# Patient Record
Sex: FEMALE | Race: WHITE | NOT HISPANIC OR LATINO | Employment: OTHER | URBAN - NONMETROPOLITAN AREA
[De-identification: names, ages, dates, MRNs, and addresses within clinical notes are randomized per-mention and may not be internally consistent; named-entity substitution may affect disease eponyms.]

---

## 2017-01-04 RX ORDER — QUETIAPINE FUMARATE 25 MG/1
TABLET, FILM COATED ORAL
Qty: 30 TABLET | Refills: 0 | Status: SHIPPED | OUTPATIENT
Start: 2017-01-04 | End: 2017-03-02 | Stop reason: SDUPTHER

## 2017-01-09 ENCOUNTER — TELEPHONE (OUTPATIENT)
Dept: FAMILY MEDICINE CLINIC | Facility: CLINIC | Age: 82
End: 2017-01-09

## 2017-01-09 DIAGNOSIS — R94.120 ABNORMAL HEARING SCREEN: Primary | ICD-10-CM

## 2017-01-09 NOTE — TELEPHONE ENCOUNTER
SERGEY CALLED AND WANTS TO GO AHEAD AND SET MRS ESTEPHANIE UP WITH DR SOSA FOR HER HEARING  ORDER SENT

## 2017-01-10 DIAGNOSIS — I10 ESSENTIAL HYPERTENSION: ICD-10-CM

## 2017-01-10 RX ORDER — HYDRALAZINE HYDROCHLORIDE 25 MG/1
TABLET, FILM COATED ORAL
Qty: 90 TABLET | Refills: 0 | Status: SHIPPED | OUTPATIENT
Start: 2017-01-10 | End: 2017-02-06 | Stop reason: SDUPTHER

## 2017-01-23 ENCOUNTER — OFFICE VISIT (OUTPATIENT)
Dept: FAMILY MEDICINE CLINIC | Facility: CLINIC | Age: 82
End: 2017-01-23

## 2017-01-23 VITALS
DIASTOLIC BLOOD PRESSURE: 70 MMHG | HEIGHT: 61 IN | BODY MASS INDEX: 21.71 KG/M2 | SYSTOLIC BLOOD PRESSURE: 140 MMHG | WEIGHT: 115 LBS | TEMPERATURE: 96.8 F | HEART RATE: 62 BPM | OXYGEN SATURATION: 94 %

## 2017-01-23 DIAGNOSIS — I10 ESSENTIAL HYPERTENSION: Primary | ICD-10-CM

## 2017-01-23 DIAGNOSIS — R94.120 ABNORMAL HEARING SCREEN: ICD-10-CM

## 2017-01-23 DIAGNOSIS — D46.9 MDS (MYELODYSPLASTIC SYNDROME) (HCC): ICD-10-CM

## 2017-01-23 PROCEDURE — 99213 OFFICE O/P EST LOW 20 MIN: CPT | Performed by: FAMILY MEDICINE

## 2017-01-23 NOTE — MR AVS SNAPSHOT
Lamont Stewart   1/23/2017 1:45 PM   Office Visit    Dept Phone:  [phone]   Encounter #:  [id number]    Provider:  Mahesh Prince MD   Department:  [facility]                Your Full Care Plan              Today's Medication Changes          These changes are accurate as of: 1/23/17  2:11 PM.  If you have any questions, ask your nurse or doctor.               Medication(s)that have changed:     hydrALAZINE 25 MG tablet   Commonly known as:  APRESOLINE   TAKE 1 TABLET BY MOUTH EVERY 8 HOURS   What changed:  Another medication with the same name was removed. Continue taking this medication, and follow the directions you see here.   Changed by:  Mahesh Prince MD                  Your Updated Medication List          This list is accurate as of: 1/23/17  2:11 PM.  Always use your most recent med list.                alendronate 70 MG tablet   Commonly known as:  FOSAMAX   Take 1 tablet by mouth every 7 days.       amLODIPine 2.5 MG tablet   Commonly known as:  NORVASC   Take 1 tablet by mouth 2 (Two) Times a Day.       azithromycin 250 MG tablet   Commonly known as:  ZITHROMAX   Take 2 tablets the first day, then 1 tablet daily for 4 days.       Calcium + D3 600-200 MG-UNIT tablet       carbonyl iron 45 MG tablet tablet   Commonly known as:  FEOSOL       CLARITIN 10 MG tablet   Generic drug:  loratadine       hydrALAZINE 25 MG tablet   Commonly known as:  APRESOLINE   TAKE 1 TABLET BY MOUTH EVERY 8 HOURS       latanoprost 0.005 % ophthalmic solution   Commonly known as:  XALATAN       lisinopril 10 MG tablet   Commonly known as:  PRINIVIL,ZESTRIL   TAKE 1 TABLET BY MOUTH DAILY       MIRALAX powder   Generic drug:  polyethylene glycol       pantoprazole 40 MG EC tablet   Commonly known as:  PROTONIX       QUEtiapine 25 MG tablet   Commonly known as:  SEROquel   1/2 PO QHS       timolol 0.5 % ophthalmic solution   Commonly known as:  TIMOPTIC       "VITAMIN B-12 PO       VITAMIN D-3 PO               You Were Diagnosed With        Codes Comments    Essential hypertension    -  Primary ICD-10-CM: I10  ICD-9-CM: 401.9     MDS (myelodysplastic syndrome)     ICD-10-CM: D46.9  ICD-9-CM: 238.75     Abnormal hearing screen     ICD-10-CM: R94.120  ICD-9-CM: 794.15       Instructions     None    Patient Instructions History      Upcoming Appointments     Visit Type Date Time Department    FOLLOW UP 1/23/2017  1:45 PM [facility] [place]      CoNarrative Signup     Our records indicate that you have declined [employer] signup. If you would like to sign up for CoNarrative, please email [email or web address] or call [phone] to obtain an activation code.             Other Info from Your Visit           Allergies     Penicillins      Cardizem Cd [Diltiazem Hcl Er Beads]      Dyazide [Hydrochlorothiazide W-triamterene]      Lipitor [Atorvastatin]      Macrodantin [Nitrofurantoin Macrocrystal]      Sulfonylureas        Reason for Visit     Hypertension           Vital Signs     Blood Pressure Pulse Temperature Height Weight Last Menstrual Period    140/70 (BP Location: Left arm, Patient Position: Sitting) 62 96.8 °F (36 °C) 60.6\" (153.9 cm) 115 lb (52.2 kg) (LMP Unknown)    Oxygen Saturation Body Mass Index Smoking Status             94% 22.02 kg/m2 Former Smoker         Problems and Diagnoses Noted     High blood pressure    MDS (myelodysplastic syndrome)    Abnormal hearing screen            "

## 2017-01-23 NOTE — PROGRESS NOTES
Subjective   Lamont Stewart is a 89 y.o. female.     History of Present Illness   Chief complaint is hypertension.  See regimen from her last note in December.  Has had blood recently and her pressure is been a little bit more labile.  Voices no complaints however.  The following portions of the patient's history were reviewed and updated as appropriate: allergies, current medications, past family history, past medical history, past social history, past surgical history and problem list.    Review of Systems   Constitutional: Negative for fatigue.   HENT: Negative.    Respiratory: Negative for shortness of breath.    Cardiovascular: Negative for chest pain.       Objective   Physical Exam   Constitutional: She is oriented to person, place, and time. She appears well-nourished.   HENT:   Head: Normocephalic.   Eyes: EOM are normal.   Cardiovascular: Normal rate.    Pulmonary/Chest: Effort normal.   Neurological: She is alert and oriented to person, place, and time.   Skin: Skin is warm.       Assessment/Plan   Lamont was seen today for hypertension.    Diagnoses and all orders for this visit:    Essential hypertension    MDS (myelodysplastic syndrome)    Abnormal hearing screen    Hypertension.  Reading today is good.  She has received 2 units of blood and her pressure has been up though the last few days.  Still,I want to continue her blood pressure regimen the same, that is, amlodipine 2.5 twice a day.  Hydralazine 25 3 times a day.  Lisinopril daily at noon.we'll follow up on this in about 6 weeks    Myelodysplastic syndrome.  Her coloring is so much better since she's received the blood.  Has a regular appointment with Dr. Tomlin scheduled to follow this.    Abnormal hearing.  She was supposed to have an appointment to see the audiologist.  Had to be canceled.  This is scheduled for February 1.

## 2017-02-06 DIAGNOSIS — I10 ESSENTIAL HYPERTENSION: ICD-10-CM

## 2017-02-06 RX ORDER — HYDRALAZINE HYDROCHLORIDE 25 MG/1
TABLET, FILM COATED ORAL
Qty: 90 TABLET | Refills: 0 | Status: SHIPPED | OUTPATIENT
Start: 2017-02-06 | End: 2017-03-07 | Stop reason: SDUPTHER

## 2017-03-02 RX ORDER — QUETIAPINE FUMARATE 25 MG/1
TABLET, FILM COATED ORAL
Qty: 30 TABLET | Refills: 0 | Status: SHIPPED | OUTPATIENT
Start: 2017-03-02 | End: 2017-04-24 | Stop reason: SDUPTHER

## 2017-03-02 RX ORDER — LISINOPRIL 10 MG/1
TABLET ORAL
Qty: 90 TABLET | Refills: 0 | Status: SHIPPED | OUTPATIENT
Start: 2017-03-02 | End: 2017-04-24 | Stop reason: SDUPTHER

## 2017-03-07 DIAGNOSIS — I10 ESSENTIAL HYPERTENSION: ICD-10-CM

## 2017-03-09 RX ORDER — HYDRALAZINE HYDROCHLORIDE 25 MG/1
TABLET, FILM COATED ORAL
Qty: 90 TABLET | Refills: 0 | Status: SHIPPED | OUTPATIENT
Start: 2017-03-09 | End: 2017-04-05 | Stop reason: SDUPTHER

## 2017-03-14 ENCOUNTER — OFFICE VISIT (OUTPATIENT)
Dept: FAMILY MEDICINE CLINIC | Facility: CLINIC | Age: 82
End: 2017-03-14

## 2017-03-14 VITALS
HEART RATE: 85 BPM | SYSTOLIC BLOOD PRESSURE: 130 MMHG | BODY MASS INDEX: 22.47 KG/M2 | OXYGEN SATURATION: 96 % | DIASTOLIC BLOOD PRESSURE: 70 MMHG | HEIGHT: 61 IN | WEIGHT: 119 LBS

## 2017-03-14 DIAGNOSIS — D46.9 MDS (MYELODYSPLASTIC SYNDROME) (HCC): ICD-10-CM

## 2017-03-14 DIAGNOSIS — D64.9 ANEMIA, UNSPECIFIED TYPE: ICD-10-CM

## 2017-03-14 DIAGNOSIS — I10 ESSENTIAL HYPERTENSION: Primary | ICD-10-CM

## 2017-03-14 PROCEDURE — 99213 OFFICE O/P EST LOW 20 MIN: CPT | Performed by: FAMILY MEDICINE

## 2017-03-14 NOTE — PROGRESS NOTES
Subjective   Lamont Stewart is a 89 y.o. female.     History of Present Illness   89-year-old female with multiple chronic problems including hypertension, chronic anemia due to a myelodysplastic syndrome.  Is followed closely by Dr. Tomlin.  Regarding the latter, he has advised that she take no more iron.  She is getting transfusions and I'm sure there is some concern about iron overload.  Overall today she feels well.  She's very often week but feels good today.  Recently has obtained hearing aids which is made her life much improved.  The following portions of the patient's history were reviewed and updated as appropriate: allergies, current medications, past family history, past medical history, past social history, past surgical history and problem list.    Review of Systems   Constitutional: Positive for fatigue. Negative for fever and unexpected weight change.   HENT: Negative.    Respiratory: Negative for cough and shortness of breath.    Cardiovascular: Negative for leg swelling.   Hematological: Negative for adenopathy.       Objective   Physical Exam   Constitutional: She appears well-nourished.   HENT:   Right Ear: External ear normal.   Left Ear: External ear normal.   Eyes: EOM are normal.   Neck: Neck supple.   Cardiovascular: Normal rate and regular rhythm.    Murmur heard.   Systolic murmur is present with a grade of 3/6   Pulmonary/Chest: Effort normal and breath sounds normal.   Vitals reviewed.      Assessment/Plan   Lamont was seen today for hypertension.    Diagnoses and all orders for this visit:    Essential hypertension    Anemia, unspecified type    MDS (myelodysplastic syndrome)    Hypertension.  Overall she is doing well.    Anemia.  Followed closely by Dr. Tomlin.  Last blood count looked very good.    Addendum.  We reviewed some of her health maintenance issues.  Dr. Tomlin does not want her to take the flu vaccine anymore.  She refuses all other vaccinations.

## 2017-04-03 RX ORDER — PANTOPRAZOLE SODIUM 40 MG/1
40 TABLET, DELAYED RELEASE ORAL DAILY
Qty: 90 TABLET | Refills: 1 | Status: SHIPPED | OUTPATIENT
Start: 2017-04-03 | End: 2017-04-24 | Stop reason: SDUPTHER

## 2017-04-05 DIAGNOSIS — I10 ESSENTIAL HYPERTENSION: ICD-10-CM

## 2017-04-05 RX ORDER — HYDRALAZINE HYDROCHLORIDE 25 MG/1
TABLET, FILM COATED ORAL
Qty: 90 TABLET | Refills: 0 | Status: SHIPPED | OUTPATIENT
Start: 2017-04-05 | End: 2017-05-02 | Stop reason: SDUPTHER

## 2017-04-24 ENCOUNTER — OFFICE VISIT (OUTPATIENT)
Dept: FAMILY MEDICINE CLINIC | Facility: CLINIC | Age: 82
End: 2017-04-24

## 2017-04-24 VITALS
HEART RATE: 75 BPM | DIASTOLIC BLOOD PRESSURE: 60 MMHG | BODY MASS INDEX: 22.66 KG/M2 | HEIGHT: 61 IN | SYSTOLIC BLOOD PRESSURE: 140 MMHG | OXYGEN SATURATION: 96 % | WEIGHT: 120 LBS

## 2017-04-24 DIAGNOSIS — D46.9 MDS (MYELODYSPLASTIC SYNDROME) (HCC): Primary | ICD-10-CM

## 2017-04-24 DIAGNOSIS — I10 ESSENTIAL HYPERTENSION: ICD-10-CM

## 2017-04-24 DIAGNOSIS — K21.9 GASTROESOPHAGEAL REFLUX DISEASE, ESOPHAGITIS PRESENCE NOT SPECIFIED: ICD-10-CM

## 2017-04-24 DIAGNOSIS — F51.01 PRIMARY INSOMNIA: ICD-10-CM

## 2017-04-24 PROCEDURE — 99214 OFFICE O/P EST MOD 30 MIN: CPT | Performed by: FAMILY MEDICINE

## 2017-04-24 RX ORDER — QUETIAPINE FUMARATE 25 MG/1
25 TABLET, FILM COATED ORAL NIGHTLY
Qty: 30 TABLET | Refills: 0 | Status: SHIPPED | OUTPATIENT
Start: 2017-04-24 | End: 2017-05-21 | Stop reason: SDUPTHER

## 2017-04-24 RX ORDER — AMLODIPINE BESYLATE 2.5 MG/1
2.5 TABLET ORAL 2 TIMES DAILY
Qty: 60 TABLET | Refills: 5 | Status: SHIPPED | OUTPATIENT
Start: 2017-04-24

## 2017-04-24 RX ORDER — LISINOPRIL 10 MG/1
10 TABLET ORAL DAILY
Qty: 90 TABLET | Refills: 1 | Status: SHIPPED | OUTPATIENT
Start: 2017-04-24

## 2017-04-24 RX ORDER — PANTOPRAZOLE SODIUM 40 MG/1
40 TABLET, DELAYED RELEASE ORAL DAILY
Qty: 90 TABLET | Refills: 1 | Status: SHIPPED | OUTPATIENT
Start: 2017-04-24 | End: 2017-06-29 | Stop reason: SDUPTHER

## 2017-04-24 NOTE — PROGRESS NOTES
Subjective   Lamont Stewart is a 89 y.o. female.     History of Present Illness   89-year-old female with myelodysplastic syndrome, hypertension, insomnia, and chronic GERD.  Overall doing pretty well.  Has just had a transfusion 4 days ago from the hematologist.  Certainly helps her mental status but she still very weak.  Has had trouble with her blood pressure but the regimen at present seems to be doing well.  She has some insomnia issues.  Refills needed.  The following portions of the patient's history were reviewed and updated as appropriate: allergies, current medications, past family history, past medical history, past social history, past surgical history and problem list.    Review of Systems   Constitutional: Positive for fatigue. Negative for fever and unexpected weight change.   Respiratory: Negative.        Objective   Physical Exam   Constitutional: She is oriented to person, place, and time.   Slender female   Eyes: EOM are normal.   Neck: Neck supple.   Cardiovascular: Normal rate.    Pulmonary/Chest: Effort normal and breath sounds normal.   Neurological: She is alert and oriented to person, place, and time.   Skin: Skin is warm.   Psychiatric: She has a normal mood and affect.       Assessment/Plan   Lamont was seen today for med refill.    Diagnoses and all orders for this visit:    MDS (myelodysplastic syndrome)    Essential hypertension  -     amLODIPine (NORVASC) 2.5 MG tablet; Take 1 tablet by mouth 2 (Two) Times a Day.  -     lisinopril (PRINIVIL,ZESTRIL) 10 MG tablet; Take 1 tablet by mouth Daily.    Primary insomnia  -     QUEtiapine (SEROquel) 25 MG tablet; Take 1 tablet by mouth Every Night.    Gastroesophageal reflux disease, esophagitis presence not specified  -     pantoprazole (PROTONIX) 40 MG EC tablet; Take 1 tablet by mouth Daily.    Overall seems to be stable.  Has just had a transfusion.  Blood pressure is fine.  Other medicines are refilled as needed.  Follow-up in 6-8  weeks.

## 2017-05-02 DIAGNOSIS — I10 ESSENTIAL HYPERTENSION: ICD-10-CM

## 2017-05-02 RX ORDER — HYDRALAZINE HYDROCHLORIDE 25 MG/1
TABLET, FILM COATED ORAL
Qty: 90 TABLET | Refills: 0 | Status: SHIPPED | OUTPATIENT
Start: 2017-05-02 | End: 2017-05-29 | Stop reason: SDUPTHER

## 2017-05-21 DIAGNOSIS — F51.01 PRIMARY INSOMNIA: ICD-10-CM

## 2017-05-22 RX ORDER — QUETIAPINE FUMARATE 25 MG/1
TABLET, FILM COATED ORAL
Qty: 30 TABLET | Refills: 0 | Status: SHIPPED | OUTPATIENT
Start: 2017-05-22 | End: 2017-06-21 | Stop reason: SDUPTHER

## 2017-05-29 DIAGNOSIS — I10 ESSENTIAL HYPERTENSION: ICD-10-CM

## 2017-05-30 RX ORDER — HYDRALAZINE HYDROCHLORIDE 25 MG/1
TABLET, FILM COATED ORAL
Qty: 90 TABLET | Refills: 0 | Status: SHIPPED | OUTPATIENT
Start: 2017-05-30 | End: 2017-06-27 | Stop reason: SDUPTHER

## 2017-05-31 RX ORDER — LISINOPRIL 10 MG/1
TABLET ORAL
Qty: 90 TABLET | Refills: 0 | Status: SHIPPED | OUTPATIENT
Start: 2017-05-31 | End: 2017-06-29

## 2017-06-21 DIAGNOSIS — F51.01 PRIMARY INSOMNIA: ICD-10-CM

## 2017-06-21 RX ORDER — QUETIAPINE FUMARATE 25 MG/1
TABLET, FILM COATED ORAL
Qty: 30 TABLET | Refills: 0 | Status: SHIPPED | OUTPATIENT
Start: 2017-06-21 | End: 2017-07-20 | Stop reason: SDUPTHER

## 2017-06-27 DIAGNOSIS — I10 ESSENTIAL HYPERTENSION: ICD-10-CM

## 2017-06-28 RX ORDER — HYDRALAZINE HYDROCHLORIDE 25 MG/1
TABLET, FILM COATED ORAL
Qty: 90 TABLET | Refills: 0 | Status: SHIPPED | OUTPATIENT
Start: 2017-06-28

## 2017-06-29 ENCOUNTER — OFFICE VISIT (OUTPATIENT)
Dept: FAMILY MEDICINE CLINIC | Facility: CLINIC | Age: 82
End: 2017-06-29

## 2017-06-29 VITALS
SYSTOLIC BLOOD PRESSURE: 120 MMHG | HEIGHT: 61 IN | TEMPERATURE: 98.1 F | DIASTOLIC BLOOD PRESSURE: 60 MMHG | OXYGEN SATURATION: 96 % | HEART RATE: 87 BPM | BODY MASS INDEX: 22.28 KG/M2 | WEIGHT: 118 LBS

## 2017-06-29 DIAGNOSIS — K21.9 GASTROESOPHAGEAL REFLUX DISEASE, ESOPHAGITIS PRESENCE NOT SPECIFIED: ICD-10-CM

## 2017-06-29 DIAGNOSIS — D46.9 MDS (MYELODYSPLASTIC SYNDROME) (HCC): ICD-10-CM

## 2017-06-29 DIAGNOSIS — R05.9 COUGH: Primary | ICD-10-CM

## 2017-06-29 PROCEDURE — 99213 OFFICE O/P EST LOW 20 MIN: CPT | Performed by: FAMILY MEDICINE

## 2017-06-29 RX ORDER — PANTOPRAZOLE SODIUM 40 MG/1
40 TABLET, DELAYED RELEASE ORAL DAILY
Qty: 90 TABLET | Refills: 1 | Status: SHIPPED | OUTPATIENT
Start: 2017-06-29

## 2017-06-29 NOTE — PROGRESS NOTES
Subjective   Lamont Stewart is a 89 y.o. female.     History of Present Illness   89-year-old who has a slight cough.  Also feels very tired.  Has a history of myelodysplastic syndrome and is concerned that she may be anemic.  Has seen Dr. Tomlin please been on vacation so they are not aware of their most recent blood test  The following portions of the patient's history were reviewed and updated as appropriate: allergies, current medications, past family history, past medical history, past social history, past surgical history and problem list.    Review of Systems   Constitutional: Positive for fatigue.   HENT: Negative for congestion.    Respiratory: Positive for cough and shortness of breath. Negative for wheezing.    Cardiovascular: Negative for chest pain.   Gastrointestinal: Negative.        Objective   Physical Exam   Constitutional:   Thin, pale, no acute distress   HENT:   Head: Normocephalic.   Eyes: EOM are normal.   Lower conjunctiva a little pale   Cardiovascular: Normal rate.    Pulmonary/Chest: Effort normal and breath sounds normal.       Assessment/Plan   Lamont was seen today for med refill and hypertension.    Diagnoses and all orders for this visit:    Cough    Gastroesophageal reflux disease, esophagitis presence not specified    MDS (myelodysplastic syndrome)    Cough.  Lungs are clear.  Sats are good and no fever.  I do not think she has a pneumonia.    History of GERD.  Refill PPI.    Myelodysplastic syndrome.  Hemoglobin is in the eights.  I want him to call Dr. Tomlin today and see if he wants to transfuse.

## 2017-07-20 DIAGNOSIS — F51.01 PRIMARY INSOMNIA: ICD-10-CM

## 2017-07-20 RX ORDER — QUETIAPINE FUMARATE 25 MG/1
TABLET, FILM COATED ORAL
Qty: 30 TABLET | Refills: 0 | Status: SHIPPED | OUTPATIENT
Start: 2017-07-20 | End: 2017-08-17 | Stop reason: SDUPTHER

## 2017-08-04 ENCOUNTER — TELEPHONE (OUTPATIENT)
Dept: FAMILY MEDICINE CLINIC | Facility: CLINIC | Age: 82
End: 2017-08-04

## 2017-08-04 NOTE — TELEPHONE ENCOUNTER
I called Carly back and she took her mom's BP again about 30 min ago and it was 120/80. I told her that was ok. I think she was worried that it was too low because Dr Prince wants it to be 140's over 80's. She will not give her the Lisinopril that is due here in about 30 in if her bp seems to be still running low.

## 2017-08-04 NOTE — TELEPHONE ENCOUNTER
----- Message from Nicki Bingham sent at 8/4/2017 11:55 AM EDT -----  DAUGHTER CALLED AND STATED THAT BP /96 AND WANTED TO KNOW IF SHE NEEDED TO BE SEEN OR TAKEN TO ER    PLEASE CALL SERGEY -[phone]

## 2017-08-17 ENCOUNTER — OFFICE VISIT (OUTPATIENT)
Dept: FAMILY MEDICINE CLINIC | Facility: CLINIC | Age: 82
End: 2017-08-17

## 2017-08-17 VITALS
WEIGHT: 118 LBS | SYSTOLIC BLOOD PRESSURE: 140 MMHG | DIASTOLIC BLOOD PRESSURE: 60 MMHG | HEIGHT: 61 IN | HEART RATE: 80 BPM | BODY MASS INDEX: 22.28 KG/M2 | OXYGEN SATURATION: 96 %

## 2017-08-17 DIAGNOSIS — Z74.09 IMPAIRED FUNCTIONAL MOBILITY, BALANCE, GAIT, AND ENDURANCE: ICD-10-CM

## 2017-08-17 DIAGNOSIS — M79.604 RIGHT LEG PAIN: ICD-10-CM

## 2017-08-17 DIAGNOSIS — I10 ESSENTIAL HYPERTENSION: Primary | ICD-10-CM

## 2017-08-17 DIAGNOSIS — R35.0 FREQUENCY OF URINATION: ICD-10-CM

## 2017-08-17 DIAGNOSIS — F51.01 PRIMARY INSOMNIA: ICD-10-CM

## 2017-08-17 DIAGNOSIS — D46.9 MDS (MYELODYSPLASTIC SYNDROME) (HCC): ICD-10-CM

## 2017-08-17 PROCEDURE — 99213 OFFICE O/P EST LOW 20 MIN: CPT | Performed by: FAMILY MEDICINE

## 2017-08-17 PROCEDURE — 81001 URINALYSIS AUTO W/SCOPE: CPT | Performed by: FAMILY MEDICINE

## 2017-08-17 NOTE — PROGRESS NOTES
Subjective   Lamont Stewart is a 89 y.o. female.     History of Present Illness   89-year-old female with multiple chronic problems including a history of hypertension, myelodysplastic syndrome followed by Dr. Tomlin, impaired mobility and balance, and right leg pain.  Has just seen Dr. Tomlin he was ordered an x-ray of her right leg.  Apparently in the past and orthopedic surgeon wanted to operate and he dissuaded her from this.  She now has to get around in a little mobile chair.  She is tired of chronic illness and has no fear of death.  States she is not eating much except for breakfast which she enjoys.  The following portions of the patient's history were reviewed and updated as appropriate: allergies, current medications, past family history, past medical history, past social history, past surgical history and problem list.    Review of Systems   Constitutional: Positive for fatigue. Negative for chills, fever and unexpected weight change.   Respiratory: Negative for cough and shortness of breath.    Musculoskeletal:        Right leg pain above the right knee   Psychiatric/Behavioral: Negative for behavioral problems.       Objective   Physical Exam   Constitutional:   Slender, alert but tired looking   HENT:   Head: Normocephalic.   Neck: Neck supple.   Cardiovascular: Normal rate and regular rhythm.    Pulmonary/Chest: No respiratory distress. She has no wheezes. She has no rales.   Skin: Skin is warm.   Psychiatric: She has a normal mood and affect.       Assessment/Plan   Diagnoses and all orders for this visit:    Essential hypertension    MDS (myelodysplastic syndrome)    Impaired functional mobility, balance, gait, and endurance    Right leg pain    Elderly woman with chronic illness which has required multiple blood transfusions in the past.  Dr. Tomlin is handling these decisions.  Her blood pressures fine.  She's having right leg pain and an x-ray is been ordered.  Her mobility is certainly  impaired but her daughter is on top of this.  May need a wheelchair soon.  Tentative follow-up in 2 months

## 2017-08-18 LAB
BILIRUB BLD-MCNC: NEGATIVE MG/DL
CLARITY, POC: ABNORMAL
COLOR UR: YELLOW
GLUCOSE UR STRIP-MCNC: NEGATIVE MG/DL
KETONES UR QL: NEGATIVE
LEUKOCYTE EST, POC: ABNORMAL
LEUKOCYTE ESTERASE URINE, POC: POSITIVE
NITRITE UR-MCNC: NEGATIVE MG/ML
PH UR: 7 [PH] (ref 5–8)
PROT UR STRIP-MCNC: NEGATIVE MG/DL
RBC # UR STRIP: ABNORMAL /UL
RBC # UR STRIP: ABNORMAL /UL
SP GR UR: 1.01 (ref 1–1.03)
SQUAMOUS EPITHELIAL, POC: ABNORMAL
UROBILINOGEN UR QL: NORMAL

## 2017-08-18 RX ORDER — QUETIAPINE FUMARATE 25 MG/1
TABLET, FILM COATED ORAL
Qty: 30 TABLET | Refills: 2 | Status: SHIPPED | OUTPATIENT
Start: 2017-08-18

## 2017-08-21 ENCOUNTER — DOCUMENTATION (OUTPATIENT)
Dept: FAMILY MEDICINE CLINIC | Facility: CLINIC | Age: 82
End: 2017-08-21

## 2017-08-21 NOTE — PROGRESS NOTES
Last Friday night I found a message sent to me by Betzy stating that Mrs. Stewart had an abnormal urinalysis.  I called the family from home and called out Cipro, 250 mg, twice a day #10.